# Patient Record
Sex: FEMALE | Race: WHITE | Employment: STUDENT | URBAN - METROPOLITAN AREA
[De-identification: names, ages, dates, MRNs, and addresses within clinical notes are randomized per-mention and may not be internally consistent; named-entity substitution may affect disease eponyms.]

---
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
## 2025-01-08 ENCOUNTER — OFFICE VISIT (OUTPATIENT)
Dept: FAMILY MEDICINE CLINIC | Facility: CLINIC | Age: 20
End: 2025-01-08
Payer: MEDICAID

## 2025-01-08 VITALS
HEIGHT: 62 IN | BODY MASS INDEX: 17.11 KG/M2 | HEART RATE: 122 BPM | SYSTOLIC BLOOD PRESSURE: 92 MMHG | RESPIRATION RATE: 16 BRPM | WEIGHT: 93 LBS | TEMPERATURE: 99 F | OXYGEN SATURATION: 98 % | DIASTOLIC BLOOD PRESSURE: 60 MMHG

## 2025-01-08 DIAGNOSIS — B34.9 ACUTE VIRAL SYNDROME: Primary | ICD-10-CM

## 2025-01-08 PROCEDURE — 99213 OFFICE O/P EST LOW 20 MIN: CPT | Performed by: NURSE PRACTITIONER

## 2025-01-08 PROCEDURE — 87637 SARSCOV2&INF A&B&RSV AMP PRB: CPT | Performed by: NURSE PRACTITIONER

## 2025-01-08 NOTE — PROGRESS NOTES
CHIEF COMPLAINT:     Chief Complaint   Patient presents with    Cold     Sx onset Monday   C/o croupy cough, nasal congestion, scratchy throat, dizzy   OTC Nyquil, nasal spray, cough drops   Denies fever  No recent Covid test        HPI:   Hailee Bautista is a 19 year old female who presents for upper respiratory symptoms for  3 days. Patient reports congestion, dry cough, and scratchy thorat . Symptoms have been persisting since onset.  Treating symptoms as abov.  Denies fever, chills, body aches, N/V/D.   No home covid test.   No recent exposures.     Current Outpatient Medications   Medication Sig Dispense Refill    famotidine (PEPCID) 20 MG Oral Tab Take 1 tablet (20 mg total) by mouth 2 (two) times daily. (Patient not taking: Reported on 12/17/2024) 60 tablet 0    DULoxetine 30 MG Oral Cap DR Particles       hydrOXYzine 25 MG Oral Tab Take 1 tablet (25 mg total) by mouth nightly as needed.      lamoTRIgine 25 MG Oral Tab       DULoxetine 60 MG Oral Cap DR Particles Take 1 capsule (60 mg total) by mouth daily.        Past Medical History:    Anxiety    Depression    Irregular periods/menstrual cycles      Past Surgical History:   Procedure Laterality Date    Adenoidectomy      Tonsillectomy           Social History     Socioeconomic History    Marital status: Single   Tobacco Use    Smoking status: Never     Passive exposure: Never    Smokeless tobacco: Current    Tobacco comments:     Vapes   Vaping Use    Vaping status: Some Days    Substances: Nicotine    Devices: Disposable   Substance and Sexual Activity    Alcohol use: Never    Drug use: Yes     Types: Cannabis     Comment: Pt. reports using cannabis 3x a day    Sexual activity: Not Currently     Social Drivers of Health     Food Insecurity: No Food Insecurity (3/29/2022)    Received from [facility], [facility]    Food Insecurity     Within the past 30 days, I worried whether my food would run out before  I got money to buy more. / En los últimos 30 días, me preocupó que la comida se podía acabar antes de tener dinero para compr...: Never true / Nunca     Within the past 30 days, the food that I bought just didn't last, and I didn't have money to get more. / En los últimos 30 días, La comida que compré no rindió lo suficiente, y no tenía dinero para...: Never true / Nunca         REVIEW OF SYSTEMS:   GENERAL: see HPI  SKIN: no rashes or abnormal skin lesions  HEENT: See HPI  LUNGS: denies shortness of breath or wheezing, See HPI  CARDIOVASCULAR: denies chest pain or palpitations   GI: denies N/V/C or abdominal pain  NEURO: Denies headache    EXAM:   BP 92/60   Pulse (!) 122   Temp 99.3 °F (37.4 °C) (Tympanic)   Resp 16   Ht 5' 2\" (1.575 m)   Wt 93 lb (42.2 kg)   LMP 01/06/2025 (Approximate)   SpO2 98%   BMI 17.01 kg/m²   GENERAL: well nourished,in no apparent distress  SKIN: no rashes,no suspicious lesions  HEAD: atraumatic, normocephalic.    EYES: conjunctiva clear, EOM intact  EARS: TM's gray, no bulging, no retraction,no fluid, bony landmarks intact  NOSE: Nostrils patent, no nasal discharge  THROAT: Oral mucosa pink, moist. Posterior pharynx is not erythematous. no exudates. Tonsils 0/4.    NECK: Supple, non-tender  LUNGS: clear to auscultation bilaterally. Breathing is non labored.  CARDIO: RRR without murmur  LYMPH:  no cervical lymphadenopathy.        ASSESSMENT AND PLAN:   Hailee Bautista is a 19 year old female who presents with     ASSESSMENT:   Encounter Diagnosis   Name Primary?    Acute viral syndrome Yes       PLAN:   Work note provided per request  Comfort care as described in Patient Instructions  Discussed viral vs bacterial etiology of URIs. Patient was informed that antibiotics are not effective for treating viral ailments and can result in antibiotic resistance. Reviewed symptom relief measures with patient.   The patient indicates understanding of these issues and agrees to the  plan.  The patient is asked to seek care if no improvement in 2-3 days, sooner if worsening,

## 2025-01-09 LAB
FLUAV + FLUBV RNA SPEC NAA+PROBE: NOT DETECTED
FLUAV + FLUBV RNA SPEC NAA+PROBE: NOT DETECTED
RSV RNA SPEC NAA+PROBE: NOT DETECTED
SARS-COV-2 RNA RESP QL NAA+PROBE: NOT DETECTED

## 2025-06-17 ENCOUNTER — OFFICE VISIT (OUTPATIENT)
Dept: FAMILY MEDICINE CLINIC | Facility: CLINIC | Age: 20
End: 2025-06-17
Payer: MEDICAID

## 2025-06-17 VITALS
SYSTOLIC BLOOD PRESSURE: 106 MMHG | HEIGHT: 61.5 IN | WEIGHT: 89 LBS | DIASTOLIC BLOOD PRESSURE: 68 MMHG | BODY MASS INDEX: 16.59 KG/M2 | HEART RATE: 115 BPM | OXYGEN SATURATION: 99 % | RESPIRATION RATE: 18 BRPM

## 2025-06-17 DIAGNOSIS — Z20.828 CONTACT WITH OR EXPOSURE TO VIRAL DISEASE: ICD-10-CM

## 2025-06-17 DIAGNOSIS — J02.9 SORE THROAT: Primary | ICD-10-CM

## 2025-06-17 DIAGNOSIS — Z20.822 LAB TEST NEGATIVE FOR COVID-19 VIRUS: ICD-10-CM

## 2025-06-17 DIAGNOSIS — Z01.89 PATIENT REQUEST FOR DIAGNOSTIC TESTING: ICD-10-CM

## 2025-06-17 LAB
OPERATOR ID: NORMAL
RAPID SARS-COV-2 BY PCR: NOT DETECTED

## 2025-06-17 PROCEDURE — U0002 COVID-19 LAB TEST NON-CDC: HCPCS | Performed by: NURSE PRACTITIONER

## 2025-06-17 PROCEDURE — 99213 OFFICE O/P EST LOW 20 MIN: CPT | Performed by: NURSE PRACTITIONER

## 2025-06-17 RX ORDER — FLUTICASONE PROPIONATE 50 MCG
2 SPRAY, SUSPENSION (ML) NASAL DAILY
Qty: 1 EACH | Refills: 0 | Status: SHIPPED | OUTPATIENT
Start: 2025-06-17

## 2025-06-17 NOTE — PROGRESS NOTES
CHIEF COMPLAINT:     Chief Complaint   Patient presents with    Covid     2 days w/ scratchy throat       HPI:   Hailee Bautista is a 19 year old female who presents for upper respiratory symptoms for  2 days. Patient reports sore throat, congestion, prior history of bronchitis, denies fever, denies cough, denies sinus pain. Symptoms have been improving since onset.  Treating symptoms with Advil, helps minimally. Performed COVID testing at home, possible positive test on Friday, and negative test on Monday. Requesting testing to return to work.      Current Medications[1]   Past Medical History[2]   Past Surgical History[3]      Short Social Hx on File[4]      REVIEW OF SYSTEMS:   Review of Systems   Constitutional:  Negative for chills and fever.   HENT:  Positive for sore throat.           EXAM:   /68   Pulse 115   Resp 18   Ht 5' 1.5\" (1.562 m)   Wt 89 lb (40.4 kg)   LMP 06/08/2025 (Approximate)   SpO2 99%   BMI 16.54 kg/m²   Physical Exam  Vitals and nursing note reviewed.   Constitutional:       Appearance: Normal appearance. She is not ill-appearing.   HENT:      Head: Normocephalic and atraumatic.      Right Ear: Hearing, tympanic membrane, ear canal and external ear normal. No drainage. There is no impacted cerumen. Tympanic membrane is not injected, perforated, erythematous or bulging.      Left Ear: Hearing, tympanic membrane, ear canal and external ear normal. No drainage. There is no impacted cerumen. Tympanic membrane is not injected, perforated, erythematous or bulging.      Nose: Nose normal. No mucosal edema, congestion or rhinorrhea.      Right Sinus: No maxillary sinus tenderness or frontal sinus tenderness.      Left Sinus: No maxillary sinus tenderness or frontal sinus tenderness.      Mouth/Throat:      Lips: No lesions.      Mouth: Mucous membranes are moist. No oral lesions.      Palate: No lesions.      Pharynx: Oropharynx is clear. Uvula midline. No oropharyngeal  exudate or posterior oropharyngeal erythema.      Tonsils: No tonsillar exudate or tonsillar abscesses.   Eyes:      General: No scleral icterus.        Right eye: No discharge.         Left eye: No discharge.      Conjunctiva/sclera: Conjunctivae normal.   Cardiovascular:      Rate and Rhythm: Normal rate and regular rhythm.      Heart sounds: Normal heart sounds. No murmur heard.  Pulmonary:      Effort: Pulmonary effort is normal.      Breath sounds: Normal breath sounds. No wheezing.   Lymphadenopathy:      Cervical: No cervical adenopathy.   Skin:     General: Skin is warm and dry.   Neurological:      Mental Status: She is alert and oriented to person, place, and time.   Psychiatric:         Mood and Affect: Mood normal.         Behavior: Behavior normal.           ASSESSMENT AND PLAN:   Hailee Bautista is a 19 year old female who presents with upper respiratory symptoms that are consistent with    ASSESSMENT:   Encounter Diagnoses   Name Primary?    Sore throat Yes    Contact with or exposure to viral disease     Patient request for diagnostic testing     Lab test negative for COVID-19 virus        PLAN: Exam not consistent with strep throat.  Patient has negative COVID testing in clinic today.  Patient provided work note and may use [employer] results to confirm negative testing with her employer.  Discussed starting Flonase nasal spray to reduce postnasal drip that may be contributing to scratchy throat.  Meds as below.  Comfort care as described in Patient Instructions.    Meds & Refills for this Visit:  Requested Prescriptions     Signed Prescriptions Disp Refills    fluticasone propionate 50 MCG/ACT Nasal Suspension 1 each 0     Si sprays by Each Nare route daily.     Risks, benefits, and side effects of medication explained and discussed.    The patient indicates understanding of these issues and agrees to the plan.  The patient is asked to f/u with PCP if sx's persist or worsen.              [1]   Current Outpatient Medications   Medication Sig Dispense Refill    fluticasone propionate 50 MCG/ACT Nasal Suspension 2 sprays by Each Nare route daily. 1 each 0    famotidine (PEPCID) 20 MG Oral Tab Take 1 tablet (20 mg total) by mouth 2 (two) times daily. (Patient not taking: Reported on 6/17/2025) 60 tablet 0    DULoxetine 30 MG Oral Cap DR Particles  (Patient not taking: Reported on 6/17/2025)      hydrOXYzine 25 MG Oral Tab Take 1 tablet (25 mg total) by mouth nightly as needed. (Patient not taking: Reported on 6/17/2025)      lamoTRIgine 25 MG Oral Tab  (Patient not taking: Reported on 6/17/2025)      DULoxetine 60 MG Oral Cap DR Particles Take 1 capsule (60 mg total) by mouth daily. (Patient not taking: Reported on 6/17/2025)     [2]   Past Medical History:   Anxiety    Depression    Irregular periods/menstrual cycles   [3]   Past Surgical History:  Procedure Laterality Date    Adenoidectomy      Tonsillectomy     [4]   Social History  Socioeconomic History    Marital status: Single   Tobacco Use    Smoking status: Never     Passive exposure: Never    Smokeless tobacco: Current    Tobacco comments:     Vapes   Vaping Use    Vaping status: Some Days    Substances: Nicotine    Devices: Disposable   Substance and Sexual Activity    Alcohol use: Never    Drug use: Yes     Types: Cannabis     Comment: Pt. reports using cannabis 3x a day    Sexual activity: Not Currently     Social Drivers of Health     Food Insecurity: No Food Insecurity (3/29/2022)    Received from [facility]    Food Insecurity     Within the past 30 days, I worried whether my food would run out before I got money to buy more. / En los últimos 30 días, me preocupó que la comida se podía acabar antes de tener dinero para compr...: Never true / Nunca     Within the past 30 days, the food that I bought just didn't last, and I didn't have money to get more. / En los últimos 30 días, La comida que compré no rindió lo  suficiente, y no tenía dinero para...: Never true / Nunca

## (undated) DIAGNOSIS — Z13.9 SCREENING FOR CONDITION: Primary | ICD-10-CM

## (undated) NOTE — LETTER
1/15/2020              [address]        [address] [address]         7/15/2005        To Whom It May Concern:    Due to ongoing medical issues, please excuse Lida Luna from swim class and offer her alternative

## (undated) NOTE — LETTER
[address] [place] of Child Health Examination       Student's Name  Narinder Moncada D Title          APRN                 Date  9/1/2021   Signature                                                                                                                                              Title SIGNED BY PARENT/GUARDIAN AND VERIFIED BY HEALTH CARE PROVIDER    ALLERGIES  (Food, drug, insect, other)  Loratadine, Montelukast, and Other MEDICATION  (List all prescribed or taken on a regular basis.)  Current Outpatient Medications:   Diagnosis of asth 17.61 kg/m²     DIABETES SCREENING  BMI>85% age/sex  No And any two of the following:  Family History No    Ethnic Minority  No          Signs of Insulin Resistance (hypertension, dyslipidemia, polycystic ovarian syndrome, acanthosis nigricans)    No (e.g. Short Acting Beta Antagonist): No          Controller medication (e.g. inhaled corticosteroid):   No Other   NEEDS/MODIFICATIONS required in the school setting  None DIETARY Needs/Restrictions     None   SPECIAL INSTRUCTIONS/DEVICES e.g. safety glass

## (undated) NOTE — LETTER
AUTHORIZATION FOR SURGICAL OPERATION OR OTHER PROCEDURE    1. I hereby authorize Catalina FERREIRA, and [place] [employer] [facility] staff assigned to my case to perform the following operation and/or procedure at the [place] [employer] [facility]:    Nexplanon insertion        2. My physician has explained the nature and purpose of the operation or other procedure, possible alternative methods of treatment, the risks involved, and the possibility of complication to me. I acknowledge that no guarantee has been made as to the result that may be obtained. 3.  I recognize that, during the course of this operation, or other procedure, unforseen conditions may necessitate additional or different procedure than those listed above. I, therefore, further authorize and request that the above named physician, his/her physician assistants or designees perform such procedures as are, in his/her professional opinion, necessary and desirable. 4.  Any tissue or organs removed in the operation or other procedure may be disposed of by and at the discretion of the [facility] and [facility]. 5.  I understand that in the event of a medical emergency, I will be transported by local paramedics to [facility] or other hospital emergency department. 6.  I certify that I have read and fully understand the above consent to operation and/or other procedure. 7.  I acknowledge that my physician has explained sedation/analgesia administration to me including the risks and benefits. I consent to the administration of sedation/analgesia as may be necessary or desirable in the judgement of my physician. Witness signature: ___________________________________________________ Date:  ______/______/_____                    Time:  ________ A. M.  P.M.        Patient Name:  ______Hailee Landeros _________________________________  (please print)      Patient signature: ___________________________________________________             Relationship to Patient:           []  Parent    Responsible person                          []  Spouse  In case of minor or                    [] Other  _____________   Incompetent name:  __________________________________________________                               (please print)      _____________      Responsible person  In case of minor or  Incompetent signature:  _______________________________________________    Statement of Physician  My signature below affirms that prior to the time of the procedure, I have explained to the patient and/or his/her guardian, the risks and benefits involved in the proposed treatment and any reasonable alternative to the proposed treatment. I have also explained the risks and benefits involved in the refusal of the proposed treatment and have answered the patient's questions.                         Date:  ______/______/_______  Provider                      Signature:  __________________________________________________________       Time:  ___________ A.M    P.M.

## (undated) NOTE — LETTER
AUTHORIZATION FOR SURGICAL OPERATION OR OTHER PROCEDURE    1. I hereby authorize Faye Trujillo, and [place] [employer] [facility] [facility] staff assigned to my case to perform the following operation and/or procedure at the [facility]:    _______________________________________________________________________________________________    Nexplanon removal  _______________________________________________________________________________________________    2. My physician has explained the nature and purpose of the operation or other procedure, possible alternative methods of treatment, the risks involved, and the possibility of complication to me. I acknowledge that no guarantee has been made as to the result that may be obtained. 3.  I recognize that, during the course of this operation, or other procedure, unforseen conditions may necessitate additional or different procedure than those listed above. I, therefore, further authorize and request that the above named physician, his/her physician assistants or designees perform such procedures as are, in his/her professional opinion, necessary and desirable. 4.  Any tissue or organs removed in the operation or other procedure may be disposed of by and at the discretion of the [place] [employer] [facility] [facility] and [facility]. 5.  I understand that in the event of a medical emergency, I will be transported by local paramedics to [facility] or other [facility] emergency department. 6.  I certify that I have read and fully understand the above consent to operation and/or other procedure. 7.  I acknowledge that my physician has explained sedation/analgesia administration to me including the risks and benefits. I consent to the administration of sedation/analgesia as may be necessary or desirable in the judgement of my physician.     Witness signature: ___________________________________________________ Date:  ______/______/_____                    Time: ________ A. M.  P.M. Patient Name:  ______________________________________________________  (please print)      Patient signature:  ___________________________________________________             Relationship to Patient:           []  Parent    Responsible person                          []  Spouse  In case of minor or                    [] Other  _____________   Incompetent name:  __________________________________________________                               (please print)      _____________      Responsible person  In case of minor or  Incompetent signature:  _______________________________________________    Statement of Physician  My signature below affirms that prior to the time of the procedure, I have explained to the patient and/or his/her guardian, the risks and benefits involved in the proposed treatment and any reasonable alternative to the proposed treatment. I have also explained the risks and benefits involved in the refusal of the proposed treatment and have answered the patient's questions.                         Date:  ______/______/_______  Provider                      Signature:  __________________________________________________________       Time:  ___________ A.M    P.M.

## (undated) NOTE — LETTER
Date: 12/17/2024    Patient Name: Hailee Bautista          To Whom it may concern:    This letter has been written at the patient's request. The above patient was seen today at [facility] for treatment of a medical condition.    This patient may return to work at this time.        Sincerely,    STEPHAN Lovett  Family Nurse Practitioner  [facility]

## (undated) NOTE — LETTER
Date: 6/17/2025    Patient Name: Hailee Bautista          To Whom it may concern:    This letter has been written at the patient's request. The above patient was seen at [facility] for treatment of a medical condition.    This patient should be excused from attending work/school from 6/16/2025 through 6/17/2025.    The patient may return to work/school on 6/18/2025 with the following limitations: Patient must be fever free for 24 hours without using fever reducing medication and have noted symptom improvement prior to returning to work/school.        Sincerely,    HANNA Ramires

## (undated) NOTE — LETTER
VACCINE ADMINISTRATION RECORD  PARENT / GUARDIAN APPROVAL  Date: 2021  Vaccine administered to: Torri Quick     : 7/15/2005    MRN: [id number]    A copy of the appropriate Centers for Disease Control and Prevention Vaccine Information statement

## (undated) NOTE — LETTER
Date: 12/8/2024    Patient Name: Hailee Bautista          To Whom it may concern:    Hailee Bautista was seen in my clinic today. She may return to work when she is fever free and GI symptom free for 24 hours and symptoms are overall improving. Please excuse her for days missed due to her current illness.        Sincerely,    HANNA Shin

## (undated) NOTE — LETTER
Date: 1/8/2025    Patient Name: Hailee Bautista          To Whom it may concern:    This letter has been written at the patient's request. The above patient was seen at [facility] for treatment of a medical condition.    This patient should be excused from attending work/school from 1/8 through 1/10.    The patient may return to work/school on 1/11.        Sincerely,    HANNA Carter

## (undated) NOTE — ED AVS SNAPSHOT
Nichole Thomas   MRN: [id number]    Department:  [facility] Emergency Department   Date of Visit:  9/19/2017           Disclosure     Insurance plans vary and the physician(s) referred by the ER may not be covered by your plan.  Please contact CARE PHYSICIAN AT ONCE OR RETURN IMMEDIATELY TO THE EMERGENCY DEPARTMENT. If you have been prescribed any medication(s), please fill your prescription right away and begin taking the medication(s) as directed.   If you believe that any of the medications

## (undated) NOTE — LETTER
[address] [place] [employer] Child [employer] Examination       Student's Name  Radhamanny Moncada D Title      MD                     Date  10/14/2019   Signature HEALTH HISTORY          TO BE COMPLETED AND SIGNED BY PARENT/GUARDIAN AND VERIFIED BY HEALTH CARE PROVIDER    ALLERGIES  (Food, drug, insect, other)  Montelukast MEDICATION  (List all prescribed or taken on a regular basis.)  No current outpatient medicati Ht 5' 1.5\" (1.562 m)   Wt 43.5 kg (96 lb)   BMI 17.85 kg/m²     DIABETES SCREENING  BMI>85% age/sex  No And any two of the following:  Family History No    Ethnic Minority  No          Signs of Insulin Resistance (hypertension, dyslipidemia, polycystic ov Currently Prescribed Asthma Medication:            Quick-relief  medication (e.g. Short Acting Beta Antagonist): No          Controller medication (e.g. inhaled corticosteroid):   No Other   NEEDS/MODIFICATIONS required in the school setting  None DIET

## (undated) NOTE — LETTER
State of [facility] [place] of Child Health Examination       Student's Name  Cleave Field Birth D Title                           Date     Signature HEALTH HISTORY          TO BE COMPLETED AND SIGNED BY PARENT/GUARDIAN AND VERIFIED BY HEALTH CARE PROVIDER    ALLERGIES  (Food, drug, insect, other)  Montelukast MEDICATION  (List all prescribed or taken on a regular basis.)  No current outpatient medicati Ht 5' 1.5\" (1.562 m)   Wt 43.5 kg (96 lb)   BMI 17.85 kg/m²     DIABETES SCREENING  BMI>85% age/sex  No And any two of the following:  Family History No    Ethnic Minority  No          Signs of Insulin Resistance (hypertension, dyslipidemia, polycystic ov Currently Prescribed Asthma Medication:            Quick-relief  medication (e.g. Short Acting Beta Antagonist): No          Controller medication (e.g. inhaled corticosteroid):   No Other   NEEDS/MODIFICATIONS required in the school setting  None DIET

## (undated) NOTE — LETTER
VACCINE ADMINISTRATION RECORD  PARENT / GUARDIAN APPROVAL  Date: 10/14/2019  Vaccine administered to: Brittaney Herrera     : 7/15/2005    MRN: [id number]    A copy of the appropriate Centers for Disease Control and Prevention Vaccine Information statemen